# Patient Record
Sex: FEMALE | Race: AMERICAN INDIAN OR ALASKA NATIVE | NOT HISPANIC OR LATINO
[De-identification: names, ages, dates, MRNs, and addresses within clinical notes are randomized per-mention and may not be internally consistent; named-entity substitution may affect disease eponyms.]

---

## 2019-05-20 PROBLEM — Z00.00 ENCOUNTER FOR PREVENTIVE HEALTH EXAMINATION: Status: ACTIVE | Noted: 2019-05-20

## 2019-06-20 ENCOUNTER — APPOINTMENT (OUTPATIENT)
Dept: ANTEPARTUM | Facility: CLINIC | Age: 29
End: 2019-06-20
Payer: MEDICAID

## 2019-06-20 ENCOUNTER — OUTPATIENT (OUTPATIENT)
Dept: OUTPATIENT SERVICES | Facility: HOSPITAL | Age: 29
LOS: 1 days | Discharge: HOME | End: 2019-06-20

## 2019-06-20 PROCEDURE — 76811 OB US DETAILED SNGL FETUS: CPT | Mod: 26

## 2019-07-08 ENCOUNTER — APPOINTMENT (OUTPATIENT)
Dept: ANTEPARTUM | Facility: CLINIC | Age: 29
End: 2019-07-08
Payer: MEDICAID

## 2019-07-08 ENCOUNTER — OUTPATIENT (OUTPATIENT)
Dept: OUTPATIENT SERVICES | Facility: HOSPITAL | Age: 29
LOS: 1 days | Discharge: HOME | End: 2019-07-08

## 2019-07-08 PROCEDURE — 76816 OB US FOLLOW-UP PER FETUS: CPT | Mod: 26

## 2019-11-04 ENCOUNTER — OUTPATIENT (OUTPATIENT)
Dept: OUTPATIENT SERVICES | Facility: HOSPITAL | Age: 29
LOS: 1 days | Discharge: HOME | End: 2019-11-04

## 2019-11-04 VITALS
HEART RATE: 96 BPM | DIASTOLIC BLOOD PRESSURE: 73 MMHG | SYSTOLIC BLOOD PRESSURE: 130 MMHG | RESPIRATION RATE: 16 BRPM | TEMPERATURE: 99 F

## 2019-11-04 DIAGNOSIS — O26.893 OTHER SPECIFIED PREGNANCY RELATED CONDITIONS, THIRD TRIMESTER: ICD-10-CM

## 2019-11-04 DIAGNOSIS — N89.8 OTHER SPECIFIED NONINFLAMMATORY DISORDERS OF VAGINA: ICD-10-CM

## 2019-11-04 LAB — GLUCOSE BLDC GLUCOMTR-MCNC: 110 MG/DL — HIGH (ref 70–99)

## 2019-11-04 NOTE — OB PROVIDER TRIAGE NOTE - HISTORY OF PRESENT ILLNESS
30yo  at 39w3d GA dated by LMP presents to triage complaining of two episodes of LOF. Pt states that in between the two separate episodes of LOF she did not feel continual leakage of fluid. The pt states she is unsure if broke her water or in she urinated on herself. Pt reports irregular contractions and rates her contraction pain 5/10 in intensity but is able to still breath and talk through each contraction. Pt denies VB. Pt states pregnancy complicated by GDMA1. Pt reports fasting fingersticks . Pt states 2 hr postprandial finger sticks . Rh negative RHOGAM given on 19. GBS negative. Pt denies HA, vision changes, SOB, cough, N/V/D, epigastric or RUQ pain, erythema or pain in lower extremities.

## 2019-11-04 NOTE — OB PROVIDER TRIAGE NOTE - NSHPPHYSICALEXAM_GEN_ALL_CORE
Vital Signs Last 24 Hrs  HR: 96 (04 Nov 2019 21:54) (96 - 96)  BP: 130/73 (04 Nov 2019 21:54) (130/73 - 130/73)  RR: 16 (04 Nov 2019 21:54) (16 - 16)    Physical Exam:  GA: AOX3, no apparent distress  Resp: CTAB  Cardio: RRR  Abd: soft, nontender, no palpable ctx, gravid  Extr: no erythema or pain to palpation bilaterally   SVE: 0/0/-3, vtx, intact     EFM: 145bpm/moderate variability/+accels  Evan: irregular   BSS: cephalic, MVP-3.0cm

## 2019-11-04 NOTE — OB PROVIDER TRIAGE NOTE - NSOBPROVIDERNOTE_OBGYN_ALL_OB_FT
28yo  at 39w3d GA, Rh negative, GBS negative; with vaginal discharge, no SROM not in labor  -maternal and fetal wellbeing reassured  -fetal kick count instructions provided   -labor precautions discussed   -attend next scheduled prenatal appointment     Dr. Ozuna and Dr. Zelaya aware 28yo  at 39w3d GA, Rh negative, GBS negative; with vaginal discharge, no SROM not in labor  -maternal and fetal wellbeing reassured  -fetal kick count instructions provided   -labor precautions discussed   -attend next scheduled prenatal appointment     Dr. Ozuna and Dr. Zelaya aware  As above, ruled out for rom, labor precautions, keep next appt, ed

## 2019-11-04 NOTE — OB RN TRIAGE NOTE - SUICIDE SCREENING DEPRESSION
[facility]  Vaginal Delivery   Operative Note    SUMMARY   MD to bedside to assist with delivery. Patient was checked by staff and found to be complete and +2 station. Patient pushed for approximately 30 minutes resulting in   Normal spontaneous vaginal delivery of live male infant. Infant was placed on mothers abdomen for skin to skin and bulb suctioning performed.  Infant delivered position ISABEL over perineum.  Nuchal cord: Yes, cord reduced following delivery.    Manual delivery of placenta and IV pitocin given noting good uterine tone.  2nd degree laceration noted and repaired in normal fashion with 2-0 vicryl .  Patient tolerated delivery well. Sponge needle and lap counted correctly x2.    Indications: indication for care in labor and delivery   I WAS SCRUBBED AND PRESENT THROUGHOUT    Pregnancy complicated by:   Patient Active Problem List   Diagnosis    Chronic tension headache    Obesity (BMI 35.0-39.9 without comorbidity)    Hyperprolactinemia    Thyroid nodule    Cyst, ovary, dermoid, right    GBS (group B Streptococcus carrier), +RV culture, currently pregnant    Gestational hypertension, third trimester    Normal labor and delivery     (spontaneous vaginal delivery)     Admitting GA: 37w6d    Delivery Information for  Demario Aragon    Birth information:  YOB: 2018   Time of birth: 7:43 AM   Sex: male   Head Delivery Date/Time: 2019  7:43 AM   Delivery type: Vaginal, Spontaneous   Gestational Age: 37w6d    Delivery Providers    Delivering clinician:  Lucia Osorio MD   Provider Role    Tonja Turk MD Resident    Ruthann Laguna, HENOK Registered Nurse    Raegan SANDERS Angel Surgical Tech    Teressa Chris, RN Registered Nurse            Measurements    Weight:    Length:           Apgars    Living status:  Living  Apgars:   1 min.:   5 min.:   10 min.:   15 min.:   20 min.:     Skin color:   1  1       Heart rate:   2  2       Reflex  irritability:   2  2       Muscle tone:   2  2       Respiratory effort:   2  2       Total:   9  9       Apgars assigned by:  HENOK SHANNON         Operative Delivery    Forceps attempted?:  No  Vacuum extractor attempted?:  No         Shoulder Dystocia    Shoulder dystocia present?:  No           Presentation    Presentation:  Vertex  Position:  Left Occiput Anterior           Interventions/Resuscitation    Method:  Bulb Suctioning, Tactile Stimulation       Cord    Vessels:  3 vessels  Complications:  None  Delayed Cord Clamping?:  Yes  Cord Clamped Date/Time:  2019  7:45 AM  Cord Blood Disposition:  Sent with Baby  Gases Sent?:  No  Stem Cell Collection (by MD):  No       Placenta    Placenta delivery date/time:  2019 0808  Placenta removal:  Manual removal  Placenta appearance:  Intact  Placenta disposition:  discarded           Labor Events:       labor: No     Labor Onset Date/Time:         Dilation Complete Date/Time:         Start Pushing Date/Time:       Rupture Date/Time:              Rupture type:           Fluid Amount:        Fluid Color:        Fluid Odor:        Membrane Status (PeriCalm):        Rupture Date/Time (PeriCalm):        Fluid Amount (PeriCalm):        Fluid Color (PeriCalm):         steroids: None     Antibiotics given for GBS: Yes     Induction:       Indications for induction:  Hypertension     Augmentation:       Indications for augmentation:       Labor complications: None     Additional complications:          Cervical ripening:                     Delivery:      Episiotomy: None     Indication for Episiotomy:       Perineal Lacerations: 2nd Repaired:      Periurethral Laceration:   Repaired:     Labial Laceration:   Repaired:     Sulcus Laceration:   Repaired:     Vaginal Laceration: Yes Repaired:     Cervical Laceration:   Repaired:     Repair suture:       Repair # of packets: 1     Last Value - EBL - Nursing (mL): 200     Sum - EBL - Nursing (mL): 200      Last Value - EBL - Anesthesia (mL):      Calculated QBL (mL):        Vaginal Sweep Performed: Yes     Surgicount Correct: Yes       Other providers:       Anesthesia    Method:  Epidural          Details (if applicable):  Trial of Labor      Categorization:      Priority:     Indications for :     Incision Type:       Additional  information:  Forceps:    Vacuum:    Breech:    Observed anomalies    Other (Comments):            Negative

## 2019-11-04 NOTE — OB PROVIDER TRIAGE NOTE - NSHPLABSRESULTS_GEN_ALL_CORE
Labs:  B neg  rubella immune  CF non-carrier  HIV reactive but HIV AB neg  GCT: 155  GTT: 91/192/146/111  measles immune  varicella immune  HbSAg NR   RPR NR   lead neg     Sono:  @20w1d: breech, posterior lying low placenta, MVP-4.1cm, suboptimal anatomy   @24w4d: suboptimal nuchal but no malformations seen   @30w5d: EFW-1684g (50%), MVP-4cm   @36w5d: EFW-3036g (57%), MVP-4.8cm   @37w5d: MVP-3cm, BPP-8/8  @38w4d: EFW-2856g (12%), MVP-3.46cm, BPP-8/8,

## 2019-11-05 ENCOUNTER — INPATIENT (INPATIENT)
Facility: HOSPITAL | Age: 29
LOS: 2 days | Discharge: HOME | End: 2019-11-08
Attending: OBSTETRICS & GYNECOLOGY | Admitting: OBSTETRICS & GYNECOLOGY
Payer: MEDICAID

## 2019-11-05 VITALS — DIASTOLIC BLOOD PRESSURE: 75 MMHG | SYSTOLIC BLOOD PRESSURE: 118 MMHG | HEART RATE: 91 BPM

## 2019-11-05 LAB
AMPHET UR-MCNC: NEGATIVE — SIGNIFICANT CHANGE UP
APPEARANCE UR: CLEAR — SIGNIFICANT CHANGE UP
BACTERIA # UR AUTO: ABNORMAL /HPF
BARBITURATES UR SCN-MCNC: NEGATIVE — SIGNIFICANT CHANGE UP
BASOPHILS # BLD AUTO: 0.06 K/UL — SIGNIFICANT CHANGE UP (ref 0–0.2)
BASOPHILS NFR BLD AUTO: 0.3 % — SIGNIFICANT CHANGE UP (ref 0–1)
BENZODIAZ UR-MCNC: NEGATIVE — SIGNIFICANT CHANGE UP
BILIRUB UR-MCNC: ABNORMAL
BLD GP AB SCN SERPL QL: SIGNIFICANT CHANGE UP
BUPRENORPHINE SCREEN, URINE RESULT: NEGATIVE — SIGNIFICANT CHANGE UP
COCAINE METAB.OTHER UR-MCNC: NEGATIVE — SIGNIFICANT CHANGE UP
COLOR SPEC: YELLOW — SIGNIFICANT CHANGE UP
DIFF PNL FLD: ABNORMAL
EOSINOPHIL # BLD AUTO: 0 K/UL — SIGNIFICANT CHANGE UP (ref 0–0.7)
EOSINOPHIL NFR BLD AUTO: 0 % — SIGNIFICANT CHANGE UP (ref 0–8)
EPI CELLS # UR: ABNORMAL /HPF
FENTANYL UR QL: NEGATIVE — SIGNIFICANT CHANGE UP
GLUCOSE BLDC GLUCOMTR-MCNC: 113 MG/DL — HIGH (ref 70–99)
GLUCOSE BLDC GLUCOMTR-MCNC: 75 MG/DL — SIGNIFICANT CHANGE UP (ref 70–99)
GLUCOSE BLDC GLUCOMTR-MCNC: 91 MG/DL — SIGNIFICANT CHANGE UP (ref 70–99)
GLUCOSE BLDC GLUCOMTR-MCNC: 95 MG/DL — SIGNIFICANT CHANGE UP (ref 70–99)
GLUCOSE BLDC GLUCOMTR-MCNC: 98 MG/DL — SIGNIFICANT CHANGE UP (ref 70–99)
GLUCOSE UR QL: NEGATIVE — SIGNIFICANT CHANGE UP
HCT VFR BLD CALC: 41.1 % — SIGNIFICANT CHANGE UP (ref 37–47)
HGB BLD-MCNC: 13.7 G/DL — SIGNIFICANT CHANGE UP (ref 12–16)
IMM GRANULOCYTES NFR BLD AUTO: 1.1 % — HIGH (ref 0.1–0.3)
KETONES UR-MCNC: 80 — SIGNIFICANT CHANGE UP
L&D DRUG SCREEN, URINE: SIGNIFICANT CHANGE UP
LEUKOCYTE ESTERASE UR-ACNC: ABNORMAL
LYMPHOCYTES # BLD AUTO: 1.99 K/UL — SIGNIFICANT CHANGE UP (ref 1.2–3.4)
LYMPHOCYTES # BLD AUTO: 10 % — LOW (ref 20.5–51.1)
MCHC RBC-ENTMCNC: 28.9 PG — SIGNIFICANT CHANGE UP (ref 27–31)
MCHC RBC-ENTMCNC: 33.3 G/DL — SIGNIFICANT CHANGE UP (ref 32–37)
MCV RBC AUTO: 86.7 FL — SIGNIFICANT CHANGE UP (ref 81–99)
METHADONE UR-MCNC: NEGATIVE — SIGNIFICANT CHANGE UP
MONOCYTES # BLD AUTO: 0.38 K/UL — SIGNIFICANT CHANGE UP (ref 0.1–0.6)
MONOCYTES NFR BLD AUTO: 1.9 % — SIGNIFICANT CHANGE UP (ref 1.7–9.3)
NEUTROPHILS # BLD AUTO: 17.33 K/UL — HIGH (ref 1.4–6.5)
NEUTROPHILS NFR BLD AUTO: 86.7 % — HIGH (ref 42.2–75.2)
NITRITE UR-MCNC: NEGATIVE — SIGNIFICANT CHANGE UP
NRBC # BLD: 0 /100 WBCS — SIGNIFICANT CHANGE UP (ref 0–0)
OPIATES UR-MCNC: NEGATIVE — SIGNIFICANT CHANGE UP
OXYCODONE UR-MCNC: NEGATIVE — SIGNIFICANT CHANGE UP
PCP UR-MCNC: NEGATIVE — SIGNIFICANT CHANGE UP
PH UR: 6.5 — SIGNIFICANT CHANGE UP (ref 5–8)
PLATELET # BLD AUTO: 261 K/UL — SIGNIFICANT CHANGE UP (ref 130–400)
PRENATAL SYPHILIS TEST: SIGNIFICANT CHANGE UP
PROPOXYPHENE QUALITATIVE URINE RESULT: NEGATIVE — SIGNIFICANT CHANGE UP
PROT UR-MCNC: 100
RBC # BLD: 4.74 M/UL — SIGNIFICANT CHANGE UP (ref 4.2–5.4)
RBC # FLD: 15.1 % — HIGH (ref 11.5–14.5)
RBC CASTS # UR COMP ASSIST: ABNORMAL /HPF
SP GR SPEC: SIGNIFICANT CHANGE UP (ref 1.01–1.02)
UROBILINOGEN FLD QL: 0.2 — SIGNIFICANT CHANGE UP (ref 0.2–0.2)
WBC # BLD: 19.97 K/UL — HIGH (ref 4.8–10.8)
WBC # FLD AUTO: 19.97 K/UL — HIGH (ref 4.8–10.8)
WBC UR QL: SIGNIFICANT CHANGE UP /HPF

## 2019-11-05 PROCEDURE — 99213 OFFICE O/P EST LOW 20 MIN: CPT | Mod: 25

## 2019-11-05 PROCEDURE — 59025 FETAL NON-STRESS TEST: CPT | Mod: 26,59

## 2019-11-05 PROCEDURE — 76815 OB US LIMITED FETUS(S): CPT | Mod: 26

## 2019-11-05 PROCEDURE — 59510 CESAREAN DELIVERY: CPT | Mod: U9

## 2019-11-05 RX ORDER — OXYCODONE HYDROCHLORIDE 5 MG/1
5 TABLET ORAL ONCE
Refills: 0 | Status: DISCONTINUED | OUTPATIENT
Start: 2019-11-05 | End: 2019-11-08

## 2019-11-05 RX ORDER — KETOROLAC TROMETHAMINE 30 MG/ML
30 SYRINGE (ML) INJECTION EVERY 6 HOURS
Refills: 0 | Status: DISCONTINUED | OUTPATIENT
Start: 2019-11-06 | End: 2019-11-06

## 2019-11-05 RX ORDER — ENOXAPARIN SODIUM 100 MG/ML
40 INJECTION SUBCUTANEOUS EVERY 24 HOURS
Refills: 0 | Status: DISCONTINUED | OUTPATIENT
Start: 2019-11-06 | End: 2019-11-08

## 2019-11-05 RX ORDER — FAMOTIDINE 10 MG/ML
20 INJECTION INTRAVENOUS ONCE
Refills: 0 | Status: DISCONTINUED | OUTPATIENT
Start: 2019-11-05 | End: 2019-11-05

## 2019-11-05 RX ORDER — OXYCODONE HYDROCHLORIDE 5 MG/1
5 TABLET ORAL
Refills: 0 | Status: COMPLETED | OUTPATIENT
Start: 2019-11-05 | End: 2019-11-12

## 2019-11-05 RX ORDER — HYDROMORPHONE HYDROCHLORIDE 2 MG/ML
0.5 INJECTION INTRAMUSCULAR; INTRAVENOUS; SUBCUTANEOUS
Refills: 0 | Status: DISCONTINUED | OUTPATIENT
Start: 2019-11-05 | End: 2019-11-08

## 2019-11-05 RX ORDER — CEFAZOLIN SODIUM 1 G
2000 VIAL (EA) INJECTION EVERY 8 HOURS
Refills: 0 | Status: DISCONTINUED | OUTPATIENT
Start: 2019-11-06 | End: 2019-11-06

## 2019-11-05 RX ORDER — ACETAMINOPHEN 500 MG
975 TABLET ORAL
Refills: 0 | Status: DISCONTINUED | OUTPATIENT
Start: 2019-11-05 | End: 2019-11-08

## 2019-11-05 RX ORDER — IBUPROFEN 200 MG
600 TABLET ORAL EVERY 6 HOURS
Refills: 0 | Status: COMPLETED | OUTPATIENT
Start: 2019-11-05 | End: 2020-10-03

## 2019-11-05 RX ORDER — SIMETHICONE 80 MG/1
80 TABLET, CHEWABLE ORAL EVERY 6 HOURS
Refills: 0 | Status: DISCONTINUED | OUTPATIENT
Start: 2019-11-05 | End: 2019-11-08

## 2019-11-05 RX ORDER — CITRIC ACID/SODIUM CITRATE 300-500 MG
30 SOLUTION, ORAL ORAL ONCE
Refills: 0 | Status: DISCONTINUED | OUTPATIENT
Start: 2019-11-05 | End: 2019-11-05

## 2019-11-05 RX ORDER — DIPHENHYDRAMINE HCL 50 MG
25 CAPSULE ORAL EVERY 6 HOURS
Refills: 0 | Status: DISCONTINUED | OUTPATIENT
Start: 2019-11-05 | End: 2019-11-08

## 2019-11-05 RX ORDER — IBUPROFEN 200 MG
600 TABLET ORAL EVERY 6 HOURS
Refills: 0 | Status: DISCONTINUED | OUTPATIENT
Start: 2019-11-05 | End: 2019-11-08

## 2019-11-05 RX ORDER — GLYCERIN ADULT
1 SUPPOSITORY, RECTAL RECTAL AT BEDTIME
Refills: 0 | Status: DISCONTINUED | OUTPATIENT
Start: 2019-11-05 | End: 2019-11-08

## 2019-11-05 RX ORDER — SODIUM CHLORIDE 9 MG/ML
1000 INJECTION, SOLUTION INTRAVENOUS
Refills: 0 | Status: DISCONTINUED | OUTPATIENT
Start: 2019-11-05 | End: 2019-11-08

## 2019-11-05 RX ORDER — OXYTOCIN 10 UNIT/ML
333.33 VIAL (ML) INJECTION
Qty: 20 | Refills: 0 | Status: DISCONTINUED | OUTPATIENT
Start: 2019-11-05 | End: 2019-11-08

## 2019-11-05 RX ORDER — SODIUM CHLORIDE 9 MG/ML
1000 INJECTION, SOLUTION INTRAVENOUS
Refills: 0 | Status: DISCONTINUED | OUTPATIENT
Start: 2019-11-05 | End: 2019-11-05

## 2019-11-05 RX ORDER — LANOLIN
1 OINTMENT (GRAM) TOPICAL EVERY 6 HOURS
Refills: 0 | Status: DISCONTINUED | OUTPATIENT
Start: 2019-11-05 | End: 2019-11-08

## 2019-11-05 RX ORDER — HYDROMORPHONE HYDROCHLORIDE 2 MG/ML
1 INJECTION INTRAMUSCULAR; INTRAVENOUS; SUBCUTANEOUS
Refills: 0 | Status: DISCONTINUED | OUTPATIENT
Start: 2019-11-05 | End: 2019-11-08

## 2019-11-05 RX ORDER — METOCLOPRAMIDE HCL 10 MG
10 TABLET ORAL ONCE
Refills: 0 | Status: DISCONTINUED | OUTPATIENT
Start: 2019-11-05 | End: 2019-11-05

## 2019-11-05 RX ORDER — MAGNESIUM HYDROXIDE 400 MG/1
30 TABLET, CHEWABLE ORAL
Refills: 0 | Status: DISCONTINUED | OUTPATIENT
Start: 2019-11-05 | End: 2019-11-08

## 2019-11-05 RX ORDER — CEFAZOLIN SODIUM 1 G
2000 VIAL (EA) INJECTION ONCE
Refills: 0 | Status: COMPLETED | OUTPATIENT
Start: 2019-11-05 | End: 2019-11-05

## 2019-11-05 RX ADMIN — Medication 100 MILLIGRAM(S): at 17:27

## 2019-11-05 NOTE — OB PROVIDER H&P - ATTENDING COMMENTS
30 y/o p0 at 39.4 wks w/c/o irreg ctx, good fm, no rom, no bleeding.   Seen last night for r/o rom, was ruled out.    pnc: HIV rna negative  GDM A1, well controlled  GBS neg  abd: j=3952 g, nt  ext: no edema  cx: 6/90/-1/i/adeq  nst: reactive  toco: irreg ctx  a/p in labor, admit, analgesia, arom, anticipate nsd , check fingerstick 30 y/o p0 at 39.4 wks w/c/o irreg ctx, good fm, no rom, no bleeding.   Seen last night for r/o rom, was ruled out.    pnc: HIV rna negative  GDM A1, fair control, recently much better  GBS neg  Rh neg, got Rhogam  abd: f=1808 g, nt  ext: no edema  cx: 6/90/-1/i/adeq  nst: reactive  toco: irreg ctx  a/p in labor, admit, analgesia, arom, anticipate nsd , check fingerstick

## 2019-11-05 NOTE — OB RN PATIENT PROFILE - ALERT: PERTINENT HISTORY
BioPhysical Profile(s)/Fetal Non-Stress Test (NST)/20 Week Level II Sonogram/Follow up Sonogram for Growth

## 2019-11-05 NOTE — OB PROVIDER H&P - NSHPPHYSICALEXAM_GEN_ALL_CORE
T(C): 36.6 (11-05-19 @ 06:57), Max: 37 (11-04-19 @ 21:54)  HR: 91 (11-05-19 @ 07:01) (91 - 96)  BP: 118/75 (11-05-19 @ 07:01) (118/75 - 130/73)  RR: 16 (11-05-19 @ 07:01) (16 - 20)    Abd: gravid, soft, NT  VE: 6/90/-1 by Dr. Zelaya  CTx: q 4 min  FHR: 150 moderate variability, Cat I T(C): 36.6 (11-05-19 @ 06:57), Max: 37 (11-04-19 @ 21:54)  HR: 91 (11-05-19 @ 07:01) (91 - 96)  BP: 118/75 (11-05-19 @ 07:01) (118/75 - 130/73)  RR: 16 (11-05-19 @ 07:01) (16 - 20)    Blood sugar 113    Abd: gravid, soft, NT  VE: 6/90/-1 by Dr. Zelaya  CTx: q 4 min  FHR: 150 moderate variability, Cat I

## 2019-11-05 NOTE — PROGRESS NOTE ADULT - SUBJECTIVE AND OBJECTIVE BOX
Ob Attending Progress    Patient seen at bedside for evaluation of labor progression.  Reports she is feeling increasing pressure in her rectal area.     T(F): 98.2 (07:27), last FS 91  HR: 115 (12:41)  BP: 107/58 (12:32)  RR: 18 (07:27)  EFM: 135 moderate variability, occasional variable/latent deceleration, category 2   TOCO: Ctx q 3  SVE: Anterior Lip/+2 station now  Right Occiput Posterior  adequate pelvis, efw 3500 gm     Labs:                        13.7   19.97 )-----------( 261      ( 2019 07:25 )             41.1           ABO RH Interpretation: B NEG (19 @ 08:09)    Urinalysis Basic - ( 2019 07:25 )    Color: Yellow / Appearance: Clear / SG: -------- / pH: x  Gluc: x / Ketone: 80  / Bili: Small / Urobili: 0.2   Blood: x / Protein: 100 / Nitrite: Negative   Leuk Esterase: Trace / RBC: 3-5 /HPF / WBC 3-5 /HPF   Sq Epi: x / Non Sq Epi: Many /HPF / Bacteria: TNTC /HPF          Meds: lactated ringers. 1000 milliLiter(s) IV Continuous <Continuous>  oxytocin Infusion 333.333 milliUNIT(s)/Min IV Continuous <Continuous>      A/P:  29y  at 39+ wks , GDM A1, in labor, nearly fully dilated  -continued observation   -Re-evaluate 1 hr  -follow fhr pattern

## 2019-11-05 NOTE — OB PROVIDER H&P - HISTORY OF PRESENT ILLNESS
29y.o.  @ 39.4wks presents c/o ctx since last night. pt was seen in L&D last night and was closed on exam. Pt denies LOF, VB and states good FM. Pt is GDM A1 diet control, Fasting BS , PP .

## 2019-11-05 NOTE — PROGRESS NOTE ADULT - ASSESSMENT
A/P:   29y  at 39w4d, GBS neg, s/p epidural, with isolated severe BP after ephedrine administration, in labor  -continue pitocin  -pain management prn  -cont efm/toco  -cont to monitor vitals  -cont iv hydration    Dr. Higgins and Dr. Washburn aware. A/P:   29y  at 39w4d, GBS neg, s/p epidural, with isolated severe BP after ephedrine administration, in labor at term.   -continue pitocin  -pain management prn  -cont efm/toco  -cont to monitor vitals  -cont iv hydration    Dr. Higgins and Dr. Washburn to be made aware.

## 2019-11-05 NOTE — BRIEF OPERATIVE NOTE - NSICDXBRIEFPROCEDURE_GEN_ALL_CORE_FT
PROCEDURES:  Primary low transverse  section 2019 20:18:21 via Pfannenstiel skin incision Minnie Pink

## 2019-11-05 NOTE — PROGRESS NOTE ADULT - SUBJECTIVE AND OBJECTIVE BOX
Pt has been fully dilated and pushing since 1330 with poor maternal effort. Station has not progressed. Due to ineffective pushing x4 hours, decision made to proceed with  section for arrest of descent.     Dr. Washburn aware.

## 2019-11-05 NOTE — OB PROVIDER H&P - ASSESSMENT
29y.o.  @ 39.4wks in labor, GDM A1, Rh negative  Admit to L&D  IVF, labs  Continuous efm and toco  Pain management PRN  Anticipate   Dr. RAGHAV Zelaya Aware

## 2019-11-05 NOTE — OB PROVIDER H&P - ALERT: PERTINENT HISTORY
Fetal Non-Stress Test (NST)/BioPhysical Profile(s)/Follow up Sonogram for Growth/20 Week Level II Sonogram

## 2019-11-05 NOTE — CHART NOTE - NSCHARTNOTEFT_GEN_A_CORE
PGY 1 note    Patient seen at bedside for deceleration s/p epidural, manual systolic BP in the 80s. Ephedrine administered, patient's BP cherrie to 90s/50s. Fetal heart tracing recovered s/p resuscitation with O2, IV bolus, and right lateral positioning.    Vital Signs Last 24 Hrs  T(F): 98.2 (05 Nov 2019 07:27), Max: 98.6 (04 Nov 2019 21:54)  HR: 108 (05 Nov 2019 08:46) (91 - 114)  BP: 148/55 (05 Nov 2019 08:46) (86/44 - 148/55)  RR: 18 (05 Nov 2019 07:27) (16 - 20)  SpO2: 100% (05 Nov 2019 08:50) (97% - 100%)    EFM: 115/mod/+accels; cat 1  [place]    Dr. Higgins and Dr. Zelaya aware. PGY 1 note    Patient seen at bedside for deceleration to the 80s, s/p epidural, manual systolic BP in the 80s. Ephedrine administered, patient's BP cherrie to 90s/50s. Fetal heart tracing recovered to 110 s/p resuscitation with O2, IV bolus, and right lateral positioning.    Vital Signs Last 24 Hrs  T(F): 98.2 (05 Nov 2019 07:27), Max: 98.6 (04 Nov 2019 21:54)  HR: 108 (05 Nov 2019 08:46) (91 - 114)  BP: 148/55 (05 Nov 2019 08:46) (86/44 - 148/55)  RR: 18 (05 Nov 2019 07:27) (16 - 20)  SpO2: 100% (05 Nov 2019 08:50) (97% - 100%)    EFM: 115/mod/+accels; cat 1  [place]: q2 mins  SVE: 8/100/0 vtx intact @0830    Dr. Higgins and Dr. Zelaya aware.

## 2019-11-05 NOTE — PRE-ANESTHESIA EVALUATION ADULT - NSANTHOSAYNRD_GEN_A_CORE
No. DARWIN screening performed.  STOP BANG Legend: 0-2 = LOW Risk; 3-4 = INTERMEDIATE Risk; 5-8 = HIGH Risk
No. DARWIN screening performed.  STOP BANG Legend: 0-2 = LOW Risk; 3-4 = INTERMEDIATE Risk; 5-8 = HIGH Risk

## 2019-11-05 NOTE — BRIEF OPERATIVE NOTE - OPERATION/FINDINGS
normal uterus, tubes and ovaries, 4cm uterine extension on left  live male infant delivered in vertex presentation, APGARS 7/9, weighing 3320gms

## 2019-11-05 NOTE — PROGRESS NOTE ADULT - SUBJECTIVE AND OBJECTIVE BOX
Pt evaluated a bedside, feeling urge to push    T(C): 37.3 (19 @ 11:05), Max: 37.3 (19 @ 11:05)  HR: 91 (19 @ 11:05) (74 - 91)  BP: 96/55 (19 @ 11:05) (93/51 - 97/55)  RR: 18 (19 @ 11:05) (18 - 20)    Pt actively pushing on right side  VE: 10/100/+2 @ 1339  Ctx: q 2 min  FH: 150 moderate variability, +early decels,    A/P: 29y.o.  @ 39.4wks in 2nd stage of labor  - Will continue pushing  - Continuous efm and toco  - Dr. Washburn aware

## 2019-11-05 NOTE — PROGRESS NOTE ADULT - SUBJECTIVE AND OBJECTIVE BOX
Ob Attending Progress    Patient seen at bedside for evaluation of labor progression and descent while pushing. Patient making moderate effort to push.       T(F): 98.96 (15:30)  HR: 98 (17:01)  BP: 117/65 (17:01)  EFM: 's moderate variability Category 1  TOCO: Ctx q3   SVE: Fullu Dilated/ vtx at +1 station , caput to +2, Occiput posterior      Labs:                        13.7   19.97 )-----------( 261      ( 2019 07:25 )             41.1           ABO RH Interpretation: B NEG (19 @ 08:09)    Urinalysis Basic - ( 2019 07:25 )    Color: Yellow / Appearance: Clear / SG: -------- / pH: x  Gluc: x / Ketone: 80  / Bili: Small / Urobili: 0.2   Blood: x / Protein: 100 / Nitrite: Negative   Leuk Esterase: Trace / RBC: 3-5 /HPF / WBC 3-5 /HPF   Sq Epi: x / Non Sq Epi: Many /HPF / Bacteria: TNTC /HPF          A/P:  29y P0 at 39+wks, GDM A1, now fully dilated for approximately 4hrs, pushed well for 3 hrs, no appreciable descent of the vertex or rotation.   With arrest of descent.   Discussed options with patient and her family   Recommend proceed to  section  R/B/A discussed. Consent signed.   Type and screened  To OR w/ anesthesia and Peds.

## 2019-11-05 NOTE — BRIEF OPERATIVE NOTE - NSICDXBRIEFPOSTOP_GEN_ALL_CORE_FT
POST-OP DIAGNOSIS:  Arrest of descent, delivered, current hospitalization 05-Nov-2019 20:22:05  Minnie Pink  Gestational diabetes mellitus, class A1 05-Nov-2019 20:21:57  Minnie Pink  39 weeks gestation of pregnancy 05-Nov-2019 20:21:31  Minnie Pink

## 2019-11-05 NOTE — PROGRESS NOTE ADULT - SUBJECTIVE AND OBJECTIVE BOX
PGY1 Note    Patient seen at bedside for evaluation of labor progression, doing well, no complaints.     T(F): 98.2 (11-05-19 @ 07:27), Max: 98.6 (11-04-19 @ 21:54)  HR: 123 (11-05-19 @ 11:36) (91 - 125)  BP: 116/72 (11-05-19 @ 11:32) (80/50 - 160/70) 160/70 after ephedrine administration  RR: 18 (11-05-19 @ 07:27) (16 - 20)  SpO2: 99% (11-05-19 @ 11:36) (88% - 100%)    EFM: 140/mod/+accels  TOCO: q2-3 mins  SVE: deferred, 9/100/0 vtx intact @1000 per Dr. Washburn's exam    Medications:        Labs:                        13.7   19.97 )-----------( 261      ( 05 Nov 2019 07:25 )             41.1           ABO RH Interpretation: B NEG (11-05-19 @ 08:09)    Antibody Screen: NEG (11-05-19 @ 08:09)    Urinalysis Basic - ( 05 Nov 2019 07:25 )    Color: Yellow / Appearance: Clear / SG: -------- / pH: x  Gluc: x / Ketone: 80  / Bili: Small / Urobili: 0.2   Blood: x / Protein: 100 / Nitrite: Negative   Leuk Esterase: Trace / RBC: 3-5 /HPF / WBC 3-5 /HPF   Sq Epi: x / Non Sq Epi: Many /HPF / Bacteria: TNTC /HPF      L&amp;D Drug Screen, Urine: Done (11-05-19 @ 07:25)    Prenatal Syphilis Test: Nonreact (11-05-19 @ 07:25)

## 2019-11-05 NOTE — OB PROVIDER H&P - NSHPLABSRESULTS_GEN_ALL_CORE
, GTT 91/ 192/ 146/ 111  CF screen negative    Sono @ 20wks S=D, breech, post low lying placenta, MVP 4.1, suboptimal anatomy  Sono @ 21wks S=D, suboptimal nuchal fold but otherwise nl anatomy  Sono @ 30.5wks S=D, EFW: 1694g, 50%, MVP 4.2    HIV reactive  HIV 1 and 2: negative  HIV RNA negative

## 2019-11-05 NOTE — BRIEF OPERATIVE NOTE - NSICDXBRIEFPREOP_GEN_ALL_CORE_FT
PRE-OP DIAGNOSIS:  Arrest of descent, delivered, current hospitalization 05-Nov-2019 20:21:21  Minnie Pink  Gestational diabetes mellitus, class A1 05-Nov-2019 20:21:11  Minnie Pink  39 weeks gestation of pregnancy 05-Nov-2019 20:18:47  Minnie Pink -check Hgb A1c  -hold Metformin   -start insulin sliding scale

## 2019-11-06 LAB
BASOPHILS # BLD AUTO: 0.03 K/UL — SIGNIFICANT CHANGE UP (ref 0–0.2)
BASOPHILS NFR BLD AUTO: 0.2 % — SIGNIFICANT CHANGE UP (ref 0–1)
EOSINOPHIL # BLD AUTO: 0.05 K/UL — SIGNIFICANT CHANGE UP (ref 0–0.7)
EOSINOPHIL NFR BLD AUTO: 0.3 % — SIGNIFICANT CHANGE UP (ref 0–8)
HCT VFR BLD CALC: 31.6 % — LOW (ref 37–47)
HGB BLD-MCNC: 10.5 G/DL — LOW (ref 12–16)
IMM GRANULOCYTES NFR BLD AUTO: 1.2 % — HIGH (ref 0.1–0.3)
LYMPHOCYTES # BLD AUTO: 14.5 % — LOW (ref 20.5–51.1)
LYMPHOCYTES # BLD AUTO: 2.28 K/UL — SIGNIFICANT CHANGE UP (ref 1.2–3.4)
MCHC RBC-ENTMCNC: 29.1 PG — SIGNIFICANT CHANGE UP (ref 27–31)
MCHC RBC-ENTMCNC: 33.2 G/DL — SIGNIFICANT CHANGE UP (ref 32–37)
MCV RBC AUTO: 87.5 FL — SIGNIFICANT CHANGE UP (ref 81–99)
MONOCYTES # BLD AUTO: 0.56 K/UL — SIGNIFICANT CHANGE UP (ref 0.1–0.6)
MONOCYTES NFR BLD AUTO: 3.6 % — SIGNIFICANT CHANGE UP (ref 1.7–9.3)
NEUTROPHILS # BLD AUTO: 12.6 K/UL — HIGH (ref 1.4–6.5)
NEUTROPHILS NFR BLD AUTO: 80.2 % — HIGH (ref 42.2–75.2)
NRBC # BLD: 0 /100 WBCS — SIGNIFICANT CHANGE UP (ref 0–0)
PLATELET # BLD AUTO: 232 K/UL — SIGNIFICANT CHANGE UP (ref 130–400)
RBC # BLD: 3.61 M/UL — LOW (ref 4.2–5.4)
RBC # FLD: 15.2 % — HIGH (ref 11.5–14.5)
WBC # BLD: 15.71 K/UL — HIGH (ref 4.8–10.8)
WBC # FLD AUTO: 15.71 K/UL — HIGH (ref 4.8–10.8)

## 2019-11-06 RX ORDER — SODIUM CHLORIDE 9 MG/ML
500 INJECTION, SOLUTION INTRAVENOUS ONCE
Refills: 0 | Status: COMPLETED | OUTPATIENT
Start: 2019-11-06 | End: 2019-11-06

## 2019-11-06 RX ORDER — IBUPROFEN 200 MG
600 TABLET ORAL EVERY 6 HOURS
Refills: 0 | Status: DISCONTINUED | OUTPATIENT
Start: 2019-11-06 | End: 2019-11-08

## 2019-11-06 RX ORDER — SODIUM CHLORIDE 9 MG/ML
50030 INJECTION, SOLUTION INTRAVENOUS ONCE
Refills: 0 | Status: DISCONTINUED | OUTPATIENT
Start: 2019-11-06 | End: 2019-11-06

## 2019-11-06 RX ADMIN — Medication 975 MILLIGRAM(S): at 00:03

## 2019-11-06 RX ADMIN — ENOXAPARIN SODIUM 40 MILLIGRAM(S): 100 INJECTION SUBCUTANEOUS at 09:51

## 2019-11-06 RX ADMIN — SODIUM CHLORIDE 1000 MILLILITER(S): 9 INJECTION, SOLUTION INTRAVENOUS at 04:46

## 2019-11-06 RX ADMIN — Medication 600 MILLIGRAM(S): at 17:33

## 2019-11-06 RX ADMIN — Medication 30 MILLIGRAM(S): at 03:01

## 2019-11-06 RX ADMIN — Medication 975 MILLIGRAM(S): at 11:24

## 2019-11-06 RX ADMIN — SIMETHICONE 80 MILLIGRAM(S): 80 TABLET, CHEWABLE ORAL at 17:33

## 2019-11-06 RX ADMIN — SIMETHICONE 80 MILLIGRAM(S): 80 TABLET, CHEWABLE ORAL at 00:03

## 2019-11-06 RX ADMIN — Medication 975 MILLIGRAM(S): at 21:15

## 2019-11-06 RX ADMIN — Medication 30 MILLIGRAM(S): at 14:52

## 2019-11-06 RX ADMIN — Medication 30 MILLIGRAM(S): at 09:51

## 2019-11-06 RX ADMIN — SIMETHICONE 80 MILLIGRAM(S): 80 TABLET, CHEWABLE ORAL at 06:05

## 2019-11-06 RX ADMIN — Medication 100 MILLIGRAM(S): at 06:05

## 2019-11-06 RX ADMIN — SIMETHICONE 80 MILLIGRAM(S): 80 TABLET, CHEWABLE ORAL at 11:23

## 2019-11-06 RX ADMIN — Medication 975 MILLIGRAM(S): at 06:05

## 2019-11-06 RX ADMIN — Medication 100 MILLIGRAM(S): at 14:52

## 2019-11-06 NOTE — PROGRESS NOTE ADULT - SUBJECTIVE AND OBJECTIVE BOX
SHIV VERDUZCO  29y  Female    PGY-2 Note:  Patient seen and examined bedside. No overnight events. Denies heavy vaginal bleeding and reports pain well controlled. Ambulating without difficulty. Tolerating Diet. Denies flatus. Denies HA, vision changes, CP, SOB, RUQ pain/ Epigastric pain, N/V, LE pain/ swelling, diarrhea.       Physical Exam  Vital Signs Last 24 Hrs  T(C): 36.1 (06 Nov 2019 03:35), Max: 37.2 (05 Nov 2019 15:30)  T(F): 96.9 (06 Nov 2019 03:35), Max: 98.96 (05 Nov 2019 15:30)  HR: 91 (06 Nov 2019 03:35) (79 - 129)  BP: 103/55 (06 Nov 2019 03:35) (80/50 - 185/111)  RR: 18 (06 Nov 2019 03:35) (16 - 20)  SpO2: 98% (05 Nov 2019 22:37) (88% - 100%)    Gen: AAOx3, NAD  CV: RRR. No murmors gallops or rubs.  Pulm: CTAB. No wheezes or rales.  Ext: No calf tenderness, no swelling.   Abd: Soft, nontender, nondistended, BS+  Fundus firm, and below umbilicus. Nontender.   Lochia: Minimal, rubra  Wound: incision clean, dry intact. Steris in place. No surrounding edema or erythema.       Labs:  Urine output: 25cc/hr until 0400. output inadequate.                         13.7   19.97 )-----------( 261      ( 05 Nov 2019 07:25 )             41.1

## 2019-11-06 NOTE — PROGRESS NOTE ADULT - ASSESSMENT
30yo, s/p LTCS for arrest of descent, POD#1, GDMA1, with isolated elevated BPs, no criteria met.   - encourage ambulation  - PO hydration  - Continue Diet as tolerated  - DVT ppx   -Incentive Spirometry bedside   - f/u CBC  -Continue vang catheter     Dr. Washburn to be aware

## 2019-11-07 RX ORDER — OXYCODONE HYDROCHLORIDE 5 MG/1
5 TABLET ORAL
Refills: 0 | Status: DISCONTINUED | OUTPATIENT
Start: 2019-11-07 | End: 2019-11-08

## 2019-11-07 RX ADMIN — Medication 975 MILLIGRAM(S): at 09:41

## 2019-11-07 RX ADMIN — Medication 975 MILLIGRAM(S): at 20:50

## 2019-11-07 RX ADMIN — OXYCODONE HYDROCHLORIDE 5 MILLIGRAM(S): 5 TABLET ORAL at 14:36

## 2019-11-07 RX ADMIN — SIMETHICONE 80 MILLIGRAM(S): 80 TABLET, CHEWABLE ORAL at 00:17

## 2019-11-07 RX ADMIN — Medication 600 MILLIGRAM(S): at 00:16

## 2019-11-07 RX ADMIN — SIMETHICONE 80 MILLIGRAM(S): 80 TABLET, CHEWABLE ORAL at 12:14

## 2019-11-07 RX ADMIN — SIMETHICONE 80 MILLIGRAM(S): 80 TABLET, CHEWABLE ORAL at 18:32

## 2019-11-07 RX ADMIN — Medication 600 MILLIGRAM(S): at 00:45

## 2019-11-07 RX ADMIN — Medication 600 MILLIGRAM(S): at 12:11

## 2019-11-07 RX ADMIN — Medication 975 MILLIGRAM(S): at 14:30

## 2019-11-07 RX ADMIN — ENOXAPARIN SODIUM 40 MILLIGRAM(S): 100 INJECTION SUBCUTANEOUS at 09:45

## 2019-11-07 NOTE — PROGRESS NOTE ADULT - SUBJECTIVE AND OBJECTIVE BOX
PGY 2 Post Partum note    Subjective: Pt seen and examined at bedside. Doing well, pain controlled. Pt is voiding without difficulty, passing flatus no BM, tolerating regular diet, ambulating, bottle/breast feeding. Denies HA, blurry vision, CP, SOB, N/V, RUQ/epigastric pain, LE pain.     Physical exam:    Vital Signs Last 24 Hrs  T(F): 97 (07 Nov 2019 00:00), Max: 97 (07 Nov 2019 00:00)  HR: 91 (07 Nov 2019 00:00) (90 - 100)  BP: 114/62 (07 Nov 2019 00:00) (103/64 - 114/62)  RR: 18 (07 Nov 2019 00:00) (18 - 18)  SpO2: 98% (07 Nov 2019 00:00) (98% - 98%)    Gen: NAD  CVS: s1s2, rrr  Lungs: ctab, no r/r/w  Abdomen: Soft, appropriately tender, no distension , firm uterine fundus below umbilicus, +BS, no RUQ/epigastric tenderness  Incision: steris in place, c/d/i  Pelvic: Normal lochia rubra noted  Ext: No calf tenderness    Diet: regular  meds:   acetaminophen   Tablet ..   975 milliGRAM(s) Oral (11-06-19 @ 21:15)   975 milliGRAM(s) Oral (11-06-19 @ 11:24)    ceFAZolin   IVPB   100 mL/Hr IV Intermittent (11-06-19 @ 14:52)    enoxaparin Injectable   40 milliGRAM(s) SubCutaneous (11-06-19 @ 09:51)    ibuprofen  Tablet.   600 milliGRAM(s) Oral (11-07-19 @ 00:16)   600 milliGRAM(s) Oral (11-06-19 @ 17:33)    ketorolac   Injectable   30 milliGRAM(s) IV Push (11-06-19 @ 14:52)   30 milliGRAM(s) IV Push (11-06-19 @ 09:51)    simethicone   80 milliGRAM(s) Chew (11-07-19 @ 00:17)   80 milliGRAM(s) Chew (11-06-19 @ 17:33)   80 milliGRAM(s) Chew (11-06-19 @ 11:23)        LABS:                        10.5   15.71 )-----------( 232      ( 06 Nov 2019 12:18 )             31.6                         13.7   19.97 )-----------( 261      ( 05 Nov 2019 07:25 )             41.1

## 2019-11-07 NOTE — PROGRESS NOTE ADULT - ATTENDING COMMENTS
Pt seen and examined at bedside, agree with the above assessment  and plan. Pt is a 28 yo now , s/p primary  section for arrest of descent, POD2, GDMA1, recovering well, + flatus, voiding well, tolerating regular diet.   -continue current care.   -Pt is Rh neg and baby rh neg  -possible d/c in Am

## 2019-11-07 NOTE — PROGRESS NOTE ADULT - ASSESSMENT
28 yo now , s/p primary  section for arrest of descent, POD2, GDMA1, isolated elevated BPs intrapartum now controlled postpartum, recovering well.    - pain management PRN  - c/w regular diet, PO hydration  - monitor vitals, bleeding  - encourage ambulation, incentive spirometry  - lovenox for DVT ppx  - anticipate d/c tomorrow    Dr. Ha aware and Dr. Zelaya to be made aware.

## 2019-11-08 ENCOUNTER — TRANSCRIPTION ENCOUNTER (OUTPATIENT)
Age: 29
End: 2019-11-08

## 2019-11-08 VITALS
RESPIRATION RATE: 18 BRPM | TEMPERATURE: 96 F | SYSTOLIC BLOOD PRESSURE: 118 MMHG | HEART RATE: 88 BPM | DIASTOLIC BLOOD PRESSURE: 75 MMHG

## 2019-11-08 RX ORDER — IBUPROFEN 200 MG
1 TABLET ORAL
Qty: 40 | Refills: 0
Start: 2019-11-08 | End: 2019-11-17

## 2019-11-08 RX ORDER — ACETAMINOPHEN 500 MG
2 TABLET ORAL
Qty: 80 | Refills: 0
Start: 2019-11-08 | End: 2019-11-17

## 2019-11-08 RX ORDER — SIMETHICONE 80 MG/1
1 TABLET, CHEWABLE ORAL
Qty: 40 | Refills: 0
Start: 2019-11-08 | End: 2019-11-17

## 2019-11-08 RX ORDER — DOCUSATE SODIUM 100 MG
1 CAPSULE ORAL
Qty: 20 | Refills: 0
Start: 2019-11-08 | End: 2019-11-17

## 2019-11-08 RX ADMIN — Medication 600 MILLIGRAM(S): at 00:35

## 2019-11-08 RX ADMIN — SIMETHICONE 80 MILLIGRAM(S): 80 TABLET, CHEWABLE ORAL at 05:30

## 2019-11-08 RX ADMIN — SIMETHICONE 80 MILLIGRAM(S): 80 TABLET, CHEWABLE ORAL at 12:32

## 2019-11-08 RX ADMIN — Medication 600 MILLIGRAM(S): at 12:32

## 2019-11-08 RX ADMIN — Medication 975 MILLIGRAM(S): at 09:31

## 2019-11-08 RX ADMIN — ENOXAPARIN SODIUM 40 MILLIGRAM(S): 100 INJECTION SUBCUTANEOUS at 09:35

## 2019-11-08 RX ADMIN — Medication 600 MILLIGRAM(S): at 05:28

## 2019-11-08 NOTE — PROGRESS NOTE ADULT - SUBJECTIVE AND OBJECTIVE BOX
PGY 2 Post Partum note    Subjective: Pt seen and examined at bedside. Doing well, pain controlled. Pt is voiding without difficulty, passing flatus no BM, tolerating regular diet, ambulating, bottle/breast feeding. Denies HA, blurry vision, CP, SOB, N/V, RUQ/epigastric pain, LE pain.       Physical exam:    Vital Signs Last 24 Hrs  T(F): 98 (08 Nov 2019 00:20), Max: 98 (08 Nov 2019 00:20)  HR: 91 (07 Nov 2019 15:15) (91 - 98)  BP: 104/57 (08 Nov 2019 00:20) (104/57 - 127/68)  RR: 18 (08 Nov 2019 00:20) (18 - 18)    Gen: NAD  CVS: s1s2, rrr  Lungs: ctab, no r/r/w  Abdomen: Soft, appropriately tender, no distension , firm uterine fundus below umbilicus, +BS, no RUQ/epigastric tenderness  Incision: steris in place, c/d/i  Pelvic: Normal lochia rubra noted  Ext: No calf tenderness    Diet: Regular  meds:   acetaminophen   Tablet ..   975 milliGRAM(s) Oral (11-07-19 @ 20:50)   975 milliGRAM(s) Oral (11-07-19 @ 14:30)   975 milliGRAM(s) Oral (11-07-19 @ 09:41)    enoxaparin Injectable   40 milliGRAM(s) SubCutaneous (11-07-19 @ 09:45)    ibuprofen  Tablet.   600 milliGRAM(s) Oral (11-08-19 @ 05:28)   600 milliGRAM(s) Oral (11-08-19 @ 00:35)   600 milliGRAM(s) Oral (11-07-19 @ 12:11)    oxyCODONE    IR   5 milliGRAM(s) Oral (11-07-19 @ 14:36)    simethicone   80 milliGRAM(s) Chew (11-08-19 @ 05:30)   80 milliGRAM(s) Chew (11-07-19 @ 18:32)   80 milliGRAM(s) Chew (11-07-19 @ 12:14)        LABS:                        10.5   15.71 )-----------( 232      ( 06 Nov 2019 12:18 )             31.6

## 2019-11-08 NOTE — DISCHARGE NOTE OB - PATIENT PORTAL LINK FT
You can access the FollowMyHealth Patient Portal offered by [facility] by registering at the following website: [email or web address]. By joining [facility]’s FollowMyHealth portal, you will also be able to view your health information using other applications (apps) compatible with our system.

## 2019-11-08 NOTE — DISCHARGE NOTE OB - MEDICATION SUMMARY - MEDICATIONS TO TAKE
I will START or STAY ON the medications listed below when I get home from the hospital:    oxycodone-acetaminophen 5 mg-325 mg oral tablet  -- 1 tab(s) by mouth every 6 hours MDD:MDD 4 tabs  -- Caution federal law prohibits the transfer of this drug to any person other  than the person for whom it was prescribed.  May cause drowsiness.  Alcohol may intensify this effect.  Use care when operating dangerous machinery.  This prescription cannot be refilled.  This product contains acetaminophen.  Do not use  with any other product containing acetaminophen to prevent possible liver damage.  Using more of this medication than prescribed may cause serious breathing problems.    -- Indication: For severe pain    ibuprofen 600 mg oral tablet  -- 1 tab(s) by mouth every 6 hours   -- Do not take this drug if you are pregnant.  It is very important that you take or use this exactly as directed.  Do not skip doses or discontinue unless directed by your doctor.  May cause drowsiness or dizziness.  Obtain medical advice before taking any non-prescription drugs as some may affect the action of this medication.  Take with food or milk.    -- Indication: For pain    Tylenol 325 mg oral tablet  -- 2 tab(s) by mouth every 6 hours   -- This product contains acetaminophen.  Do not use  with any other product containing acetaminophen to prevent possible liver damage.    -- Indication: For pain    Colace 100 mg oral capsule  -- 1 cap(s) by mouth 2 times a day   -- Medication should be taken with plenty of water.    -- Indication: For constipation    simethicone 80 mg oral tablet  -- 1 tab(s) by mouth every 6 hours   -- Indication: For gas

## 2019-11-08 NOTE — PROGRESS NOTE ADULT - ATTENDING COMMENTS
patient doing well.  no BM yet  will d/c home and f/u 2 weeks for incision check and 6 weeks for post partum  Rh neg, baby is Rh neg

## 2019-11-08 NOTE — PROGRESS NOTE ADULT - ASSESSMENT
30 yo now , s/p primary  section for arrest of descent, POD3, GDMA1, isolated elevated BPs intrapartum now controlled postpartum, recovering well.    - pain management PRN  - c/w regular diet, PO hydration  - monitor vitals, bleeding  - encourage ambulation, incentive spirometry  - lovenox for DVT ppx  - anticipate d/c today    Dr. Ha aware and Dr. Zelaya to be made aware.

## 2019-11-08 NOTE — DISCHARGE NOTE OB - HOSPITAL COURSE
11-08-19 @ 06:33    HPI:  29y.o.  @ 39.4wks presents c/o ctx since last night. pt was seen in L&D last night and was closed on exam. Pt denies LOF, VB and states good FM. Pt is GDM A1 diet control, Fasting BS , PP . (2019 07:24)      PAST MEDICAL & SURGICAL HISTORY:  Diet controlled gestational diabetes mellitus (GDM) in second trimester  No significant past surgical history      POST PARTUM COURSE:   uncomplicated c/s, pt voided, passed flatus, ambulated tolerated regular diet, discharged POD3       LABS:                        10.5   15.71 )-----------( 232      ( 2019 12:18 )             31.6                   Allergies    No Known Allergies    Intolerances

## 2019-11-08 NOTE — DISCHARGE NOTE OB - CARE PROVIDER_API CALL
John Zelaya)  Obstetrics and Gynecology  [address] [place] [address]  Phone: [phone]  Fax: [phone]  Follow Up Time: 1 week

## 2019-11-12 DIAGNOSIS — O99.89 OTHER SPECIFIED DISEASES AND CONDITIONS COMPLICATING PREGNANCY, CHILDBIRTH AND THE PUERPERIUM: ICD-10-CM

## 2019-11-12 DIAGNOSIS — R03.0 ELEVATED BLOOD-PRESSURE READING, WITHOUT DIAGNOSIS OF HYPERTENSION: ICD-10-CM

## 2019-11-12 DIAGNOSIS — O32.4XX0 MATERNAL CARE FOR HIGH HEAD AT TERM, NOT APPLICABLE OR UNSPECIFIED: ICD-10-CM

## 2019-11-12 DIAGNOSIS — Z3A.39 39 WEEKS GESTATION OF PREGNANCY: ICD-10-CM
